# Patient Record
(demographics unavailable — no encounter records)

---

## 2025-02-27 NOTE — PHYSICAL EXAM
[Alert] : alert [No Acute Distress] : no acute distress [Normal Voice/Communication] : normal voice communication [Supple] : the neck was supple [Normal S1, S2] : normal S1 and S2 [Regular Rhythm] : with a regular rhythm [Soft] : abdomen soft [Not Tender] : non-tender [Not Distended] : not distended [No HSM] : no hepato-splenomegaly [Normal Cervical Lymph Nodes] : cervical [Skin Intact] : skin intact  [No clubbing] : no clubbing [No Cyanosis] : no cyanosis [Alert, Awake, Oriented as Age-Appropriate] : alert, awake, oriented as age appropriate [de-identified] : Eyes clear. [de-identified] : Throat clear.  Nasal mucosa pink, perforated nasal septum, mild bilateral stuffy nose, no discharge.  Tympanic membranes normal.  No sinus tenderness. [de-identified] : Chest clear, good air entry, no wheezing or crackles. [de-identified] : Mild facial erythema.  No dermatographism.
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
## 2025-02-27 NOTE — ASSESSMENT
[FreeTextEntry1] : Multiple drug allergies: Penicillin caused urticaria and itching.  Levaquin caused similar symptoms.  Zithromax caused hands red and burning followed by peeling.  Doxycycline caused vomiting.  Schedule testing for beta-lactam antibiotics and Zithromax.  Informed that skin testing only predicts immediate reactions, not delayed reactions.  In case she needs penicillin or other beta-lactam antibiotics, Levaquin or Zithromax, oral desensitization can be performed. Referred for blood work for serum tryptase. Allergy to shellfish: Avoid.  Offered EpiPen, did not feel it was necessary. Chronic postnasal drip.  Continue nasal saline, may use several times a day.

## 2025-02-27 NOTE — SOCIAL HISTORY
[de-identified] : House with oil baseboard heating, fireplace, central air conditioning, no carpet, dog sits at x 1-2 dogs, currently no cigarette smoke exposure.  She did not smoke cigarettes but she smoked pot in the past.